# Patient Record
Sex: MALE | Race: WHITE | NOT HISPANIC OR LATINO | URBAN - METROPOLITAN AREA
[De-identification: names, ages, dates, MRNs, and addresses within clinical notes are randomized per-mention and may not be internally consistent; named-entity substitution may affect disease eponyms.]

---

## 2019-03-19 ENCOUNTER — EMERGENCY (EMERGENCY)
Facility: HOSPITAL | Age: 54
LOS: 1 days | Discharge: HOME | End: 2019-03-19
Attending: EMERGENCY MEDICINE | Admitting: EMERGENCY MEDICINE
Payer: MEDICAID

## 2019-03-19 VITALS
SYSTOLIC BLOOD PRESSURE: 108 MMHG | TEMPERATURE: 98 F | DIASTOLIC BLOOD PRESSURE: 76 MMHG | OXYGEN SATURATION: 98 % | HEART RATE: 62 BPM | HEIGHT: 63 IN | WEIGHT: 139.99 LBS | RESPIRATION RATE: 16 BRPM

## 2019-03-19 DIAGNOSIS — T15.91XA FOREIGN BODY ON EXTERNAL EYE, PART UNSPECIFIED, RIGHT EYE, INITIAL ENCOUNTER: ICD-10-CM

## 2019-03-19 PROCEDURE — 65205 REMOVE FOREIGN BODY FROM EYE: CPT

## 2019-03-19 PROCEDURE — 99283 EMERGENCY DEPT VISIT LOW MDM: CPT | Mod: 25

## 2019-03-19 RX ORDER — OFLOXACIN 0.3 %
1 DROPS OPHTHALMIC (EYE) ONCE
Qty: 0 | Refills: 0 | Status: COMPLETED | OUTPATIENT
Start: 2019-03-19 | End: 2019-03-19

## 2019-03-19 RX ORDER — KETOROLAC TROMETHAMINE 0.5 %
1 DROPS OPHTHALMIC (EYE) ONCE
Qty: 0 | Refills: 0 | Status: COMPLETED | OUTPATIENT
Start: 2019-03-19 | End: 2019-03-19

## 2019-03-19 RX ADMIN — Medication 1 DROP(S): at 15:48

## 2019-03-19 RX ADMIN — Medication 1 DROP(S): at 15:55

## 2019-03-19 NOTE — ED PROVIDER NOTE - OBJECTIVE STATEMENT
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 pt reports persistent and worsening pain, no change in vision, no light sensitivity, headache, or any other concerns.

## 2019-03-19 NOTE — ED PROVIDER NOTE - PHYSICAL EXAMINATION
PE:   GEN: Awake, alert, interactive, NAD, non-toxic appearing.   HEAD: Atraumatic  EYES: Sclera white, conjunctiva pink, PERRLA  LEFT EYE: EYE exam: VA: 20/25 R Eye: 20/25 L Eye: 20/50, visual fields intact. Sclera white, conjunctiva pink, Cornea clear. no chemosis, no perioribital swelling, no drainage/discharge. No Photosenstivity. PERRLA, EOMI. + FB on gross visualization no FB on lid eversion.   NEURO: AOx3, CN II-XII grossly intact without focal deficit.   SKIN: Warm, dry, normal color, without apparent rashes.

## 2019-03-19 NOTE — ED ADULT NURSE NOTE - OBJECTIVE STATEMENT
Patient presents with left eye pain/redness. He states he was grinding stone 4 days ago when he felt that something got caught in his eye. Patient tried OTC eyedrops (can not recall name) with minimal relief. Pain started today, redness and tearing present with irritation intermittent. Patient vision 20/25 bilateral in triage. Upon exam small black colored foreign body noted in left eye. Patient denies vision changes.

## 2019-03-19 NOTE — ED PROVIDER NOTE - PROGRESS NOTE DETAILS
FB intially attempted to be removed with q-tip without success, 25 g needle used to remove FB, pt tolerated procedure. will tx with toradol and ocuflox opthalmic, optho fu.

## 2019-03-19 NOTE — ED ADULT TRIAGE NOTE - CHIEF COMPLAINT QUOTE
I was grinding a stone 4 days ago and I think something got in my eye and it's moving around, vision test done- 20/25 bilateral, left and right eye with glasses

## 2019-03-19 NOTE — ED PROVIDER NOTE - NSFOLLOWUPINSTRUCTIONS_ED_ALL_ED_FT
rest, hydrate well.  ocuflox drops, 1 drop every 4 hours, to left eye for 3 days, then 1 drop 4 times a day  Ketoralac opthalmic drops, 1 drop every 6 hours    follow up with the eye doctor in 1-2 days    return for any concerns

## 2019-03-19 NOTE — ED PROVIDER NOTE - CARE PROVIDER_API CALL
Grayson Pineda)  Ophthalmology  [address] [place] [phone]  Phone: [phone]  Fax: [phone]  Follow Up Time: 1-3 Days

## 2019-03-19 NOTE — ED ADULT NURSE NOTE - CAS EDN DISCHARGE ASSESSMENT
I will SWITCH the dose or number of times a day I take the medications listed below when I get home from the hospital:  None Alert and oriented to person, place and time

## 2019-03-19 NOTE — ED PROVIDER NOTE - ATTENDING CONTRIBUTION TO CARE
Eval with NP Yuniel. Patient has foreign body in left eye. Sand/stone. No sign of globe rupture. Patient with round pupil. Plan for removal. Attempt cotton tipped applicator removal. If not, will use needle. Ophtho f/u . I performed a face to face bedside interview with patient regarding history of present illness, review of symptoms and past medical history. I completed an independent physical exam.  I have discussed the patient's plan of care with Physician Assistant (PA). I agree with note as stated above, having amended the EMR as needed to reflect my findings.   This includes History of Present Illness, HIV, Past Medical/Surgical/Family/Social History, Allergies and Home Medications, Review of Systems, Physical Exam, and any Progress Notes during the time I functioned as the attending physician for this patient.

## 2019-03-19 NOTE — ED PROVIDER NOTE - NS ED ROS FT
Constitutional: - Fever, - Chills, - Anorexia, - Fatigue  Eyes: - Discharge, - Irritation, - Redness, - Visual changes, - Light sensitivity, + FB  EARS: - Ear Pain, - Apparent hearing problems  NOSE: - Congestion, - Bloody nose  MOUTH/THROAT: - Vocal Changes, - Drooling, - Sore throat  NEURO: - Change in behavior, - Dec. Alertness, - Headache, - Dizziness
